# Patient Record
Sex: FEMALE | Race: WHITE | NOT HISPANIC OR LATINO | Employment: UNEMPLOYED | URBAN - METROPOLITAN AREA
[De-identification: names, ages, dates, MRNs, and addresses within clinical notes are randomized per-mention and may not be internally consistent; named-entity substitution may affect disease eponyms.]

---

## 2024-05-13 ENCOUNTER — OFFICE VISIT (OUTPATIENT)
Dept: NEUROSURGERY | Facility: CLINIC | Age: 79
End: 2024-05-13
Payer: MEDICARE

## 2024-05-13 VITALS — BODY MASS INDEX: 32.2 KG/M2 | WEIGHT: 175 LBS | HEIGHT: 62 IN | TEMPERATURE: 96.6 F

## 2024-05-13 DIAGNOSIS — M54.16 LUMBAR RADICULOPATHY: Primary | ICD-10-CM

## 2024-05-13 PROCEDURE — 99204 OFFICE O/P NEW MOD 45 MIN: CPT | Performed by: NEUROLOGICAL SURGERY

## 2024-05-13 RX ORDER — SPIRONOLACTONE 50 MG/1
TABLET, FILM COATED ORAL
COMMUNITY

## 2024-05-13 RX ORDER — ESCITALOPRAM OXALATE 20 MG/1
1 TABLET ORAL DAILY
COMMUNITY

## 2024-05-13 RX ORDER — ATORVASTATIN CALCIUM 10 MG/1
1 TABLET, FILM COATED ORAL DAILY
COMMUNITY

## 2024-05-13 RX ORDER — BUPROPION HYDROCHLORIDE 100 MG/1
TABLET, EXTENDED RELEASE ORAL
COMMUNITY
Start: 2024-02-15

## 2024-05-13 RX ORDER — TORSEMIDE 20 MG/1
TABLET ORAL
COMMUNITY
Start: 2024-02-15

## 2024-05-13 RX ORDER — MIRTAZAPINE 15 MG/1
30 TABLET, FILM COATED ORAL
COMMUNITY

## 2024-05-13 RX ORDER — MONTELUKAST SODIUM 10 MG/1
TABLET ORAL
COMMUNITY

## 2024-05-13 RX ORDER — OMEPRAZOLE 20 MG/1
CAPSULE, DELAYED RELEASE ORAL
COMMUNITY
Start: 2024-02-29

## 2024-05-13 RX ORDER — CARVEDILOL 25 MG/1
TABLET ORAL
COMMUNITY

## 2024-05-13 NOTE — PROGRESS NOTES
"Subjective     Chief Complaint: Possible brain aneurysm    Patient ID: Lela Dickey is a 78 y.o. female seen for consultation today at the request of  Brianna More*    History of Present Illness    This is a 78-year-old woman who presents to my office with chief complaints of left hip and leg pain which started in about February of this year.  She has been under the care of a chiropractic doctor and reports some improvement in terms of her back and leg pain.    Family history: History reviewed. No pertinent family history.    Social history:   Social History     Socioeconomic History    Marital status:    Tobacco Use    Smoking status: Never    Smokeless tobacco: Never   Vaping Use    Vaping status: Never Used   Substance and Sexual Activity    Alcohol use: Defer    Drug use: Never    Sexual activity: Defer       Review of Systems    Objective   Temperature 96.6 °F (35.9 °C), temperature source Temporal, height 157.5 cm (62\"), weight 79.4 kg (175 lb).  Body mass index is 32.01 kg/m².    Physical Exam  Constitutional:       General: She is not in acute distress.     Appearance: She is well-developed. She is not diaphoretic.      Comments: Moderate abdominal obesity   HENT:      Head: Normocephalic and atraumatic.   Pulmonary:      Effort: Pulmonary effort is normal.   Skin:     General: Skin is warm and dry.   Neurological:      Mental Status: She is alert and oriented to person, place, and time.      Cranial Nerves: No cranial nerve deficit.      Deep Tendon Reflexes: Reflexes abnormal.      Reflex Scores:       Patellar reflexes are 1+ on the right side and 0 on the left side.       Achilles reflexes are 1+ on the right side and 1+ on the left side.     Comments: Casual, toe raised, heel raise gait testing are performed unremarkably.  Tandem gait testing is deferred secondary to instability.  Romberg sign is absent.         Assessment & Plan     Independent Review of Radiographic Studies:  "     Available for my review is a MRI of the lumbar spine which was performed on 4/4/2024.  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  There is severe, diffuse degenerative disc disease.  There is significant fatty replacement of the paraspinous musculature as well as atrophy of the psoas muscle.  This is commonly seen in deconditioning of the spinal muscular support apparatus.  There is substantial visceral adiposity consistent with metabolic disease.  At L3-4, there is a large, inferiorly extruded disc fragment eccentric to the left side which could potentially be contributing to a left L4 radiculopathy.  L4-5 demonstrates some spurring of the facet joint on the left side which causes lateral recess stenosis and could potentially be contributing to a left L5 radiculopathy.  The lateral recesses and central canal at L5-S1 are capacious.    Medical Decision Making:      She certainly could have a component of the left L5 possibly L4 radiculopathy.  She has not tried any conservative treatment, so my recommendation is for pain management and some physical therapy.  If she fails to respond in the expected fashion, then we certainly could entertain the possibility of a microdiscectomy.  I will follow-up with her after she has had some pain management and some physical therapy.  I did review the signs and symptoms of cauda equina and lumbosacral radiculopathy with her.  Risks and benefits of a microdiscectomy were discussed with the patient.  I directed her to contact my office with new or worsening symptoms.    Diagnoses and all orders for this visit:    1. Lumbar radiculopathy (Primary)  -     Ambulatory Referral to Physical Therapy  -     Ambulatory Referral to Pain Management        No follow-ups on file.           This document signed by ERIC Ramirez MD May 13, 2024 16:48 EDT

## 2024-06-17 ENCOUNTER — OFFICE VISIT (OUTPATIENT)
Dept: NEUROSURGERY | Facility: CLINIC | Age: 79
End: 2024-06-17
Payer: MEDICARE

## 2024-06-17 VITALS — OXYGEN SATURATION: 96 % | BODY MASS INDEX: 34.21 KG/M2 | HEART RATE: 75 BPM | HEIGHT: 62 IN | WEIGHT: 185.9 LBS

## 2024-06-17 DIAGNOSIS — M51.26 HERNIATION OF INTERVERTEBRAL DISC AT L3-L4 LEVEL: ICD-10-CM

## 2024-06-17 DIAGNOSIS — M54.17 LUMBOSACRAL RADICULOPATHY AT L4: Primary | ICD-10-CM

## 2024-06-17 PROCEDURE — 99214 OFFICE O/P EST MOD 30 MIN: CPT | Performed by: NEUROLOGICAL SURGERY

## 2024-06-17 NOTE — PROGRESS NOTES
"Subjective     Chief Complaint: Left L4 radiculopathy    Patient ID: Lela Dickey is a 78 y.o. female is here today for follow-up.    History of Present Illness    This is a 78-year-old woman who I saw in consultation several weeks ago for subacute presentation of a left L4 radiculopathy.  She had not tried any conservative treatment so I referred her for pain management and physical therapy.  She reports improvement in terms of her symptoms.  She knows has days which she reports is pain-free.    The following portions of the patient's history were reviewed and updated as appropriate: allergies, current medications, past family history, past medical history, past social history, past surgical history and problem list.    Family history: History reviewed. No pertinent family history.    Social history:   Social History     Socioeconomic History    Marital status:    Tobacco Use    Smoking status: Never    Smokeless tobacco: Never   Vaping Use    Vaping status: Never Used   Substance and Sexual Activity    Alcohol use: Defer    Drug use: Never    Sexual activity: Defer       Review of Systems    Objective   Pulse 75, height 157.5 cm (62\"), weight 84.3 kg (185 lb 14.4 oz), SpO2 96%.  Body mass index is 34 kg/m².    Physical Exam  Constitutional:       General: She is not in acute distress.     Appearance: She is well-developed. She is not diaphoretic.   HENT:      Head: Normocephalic and atraumatic.   Pulmonary:      Effort: Pulmonary effort is normal.   Skin:     General: Skin is warm and dry.   Neurological:      Mental Status: She is alert and oriented to person, place, and time.      Cranial Nerves: No cranial nerve deficit.         Assessment & Plan     Independent Review of Radiographic Studies:      I did we review the MRI of her lumbar spine from 4/4/2024 which demonstrates a large extruded disc fragment at L3-4 eccentric to the left side.  This does appear to be compressing the descending L4 nerve " root.    Medical Decision Making:      I once again reviewed the risks and benefits of an L3-4 microdiscectomy.  The patient reports that her symptoms have improved with conservative treatment, and as such I am not recommending surgery at this time.  I carefully reviewed the signs and symptoms of lumbosacral radiculopathy and cauda equina with her.  I directed her to contact my office with new or worsening symptoms.  I be happy to follow-up with her if she is interested in pursuing surgery at this point.    Diagnoses and all orders for this visit:    1. Lumbosacral radiculopathy at L4 (Primary)    2. 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           This document signed by ERIC Ramirez MD June 17, 2024 15:39 EDT

## 2024-06-26 ENCOUNTER — TELEPHONE (OUTPATIENT)
Dept: NEUROSURGERY | Facility: CLINIC | Age: 79
End: 2024-06-26
Payer: MEDICARE

## 2024-06-26 NOTE — TELEPHONE ENCOUNTER
[facility] received a fax that requires your attention. The document has been indexed to the patient’s chart for your review.      Reason for sending: REQUEST FOR [facility] VISIT NOTES TO BE FAXED -[phone]    Documents Description: EXT MED RECS: REQUEST FOR [facility] VISIT NOTES_PRIMARY [facility]US [facility]_06/24/24     Name of Sender: [facility]    Date Indexed: 06/26/24